# Patient Record
Sex: FEMALE | Race: WHITE | NOT HISPANIC OR LATINO | Employment: FULL TIME | URBAN - METROPOLITAN AREA
[De-identification: names, ages, dates, MRNs, and addresses within clinical notes are randomized per-mention and may not be internally consistent; named-entity substitution may affect disease eponyms.]

---

## 2018-10-13 ENCOUNTER — OFFICE VISIT (OUTPATIENT)
Dept: URGENT CARE | Facility: CLINIC | Age: 58
End: 2018-10-13
Payer: MEDICARE

## 2018-10-13 VITALS
WEIGHT: 209 LBS | HEIGHT: 66 IN | HEART RATE: 81 BPM | DIASTOLIC BLOOD PRESSURE: 60 MMHG | OXYGEN SATURATION: 99 % | TEMPERATURE: 97.5 F | BODY MASS INDEX: 33.59 KG/M2 | SYSTOLIC BLOOD PRESSURE: 112 MMHG

## 2018-10-13 DIAGNOSIS — S39.012A STRAIN OF MUSCLE, FASCIA AND TENDON OF LOWER BACK, INITIAL ENCOUNTER: Primary | ICD-10-CM

## 2018-10-13 PROCEDURE — 99203 OFFICE O/P NEW LOW 30 MIN: CPT | Performed by: FAMILY MEDICINE

## 2018-10-13 RX ORDER — LAMOTRIGINE 100 MG/1
100 TABLET ORAL DAILY
COMMUNITY

## 2018-10-13 RX ORDER — ACETAMINOPHEN 500 MG
500 TABLET ORAL EVERY 8 HOURS PRN
Qty: 30 TABLET | Refills: 0 | Status: SHIPPED | OUTPATIENT
Start: 2018-10-13

## 2018-10-13 RX ORDER — ESCITALOPRAM OXALATE 10 MG/1
15 TABLET ORAL DAILY
COMMUNITY

## 2018-10-13 RX ORDER — GABAPENTIN 100 MG/1
1600 CAPSULE ORAL DAILY
COMMUNITY

## 2018-10-13 RX ORDER — ZIPRASIDONE HYDROCHLORIDE 80 MG/1
80 CAPSULE ORAL 2 TIMES DAILY WITH MEALS
COMMUNITY

## 2018-10-13 RX ORDER — HYDROCORTISONE 0.5 %
CREAM (GRAM) TOPICAL 4 TIMES DAILY
Qty: 30 G | Refills: 0 | Status: SHIPPED | OUTPATIENT
Start: 2018-10-13

## 2018-10-13 NOTE — PROGRESS NOTES
[facility]        NAME: Oralia Carrasco is a 63 y o  female  : 1960    MRN: [id number]  DATE: 2018  TIME: 1:48 PM    Assessment and Plan   Strain of muscle, fascia and tendon of lower back, initial encounter [S39 012A]  1  Strain of muscle, fascia and tendon of lower back, initial encounter  acetaminophen (TYLENOL) 500 mg tablet         Patient Instructions     Patient Instructions   1  Low back pain due to muscle strain   - unable to prescribe muscle relaxer due to interaction w/ chronic medicines   - patient may take Tylenol as needed for pain   - apply a heating pad to the back   - try gentle massage and stretches   - may use a muscle rub such as BenGay or Biofreeze  - follow up w/ pcp for re-check in 2-3 days   - if symptoms persist despite treatment, worsen, or any new symptoms present, should be seen in the ER      Chief Complaint     Chief Complaint   Patient presents with    Back Pain     began Tuesday without any prior issues has had a history of back pain with degenerative disc and  joint  disease          History of Present Illness       64 yo female presents c/o low back pain x 5 days  Pain is located across the lower back  She describes the pain and aching and stiff  She states at time it radiates into her left buttock region  No numbness/tingling or weakness of the legs  No saddle anesthesia  No loss of bowel/bladder control  She denies any injuries  No falls or accidents  No swelling or bruising  She states the day before the pain began she started a new job as a  which involved a lot of walking but no repetitive bending or heavy lifting  She has been taking Ibuprofen as needed w/ minimal relief  Review of Systems   Review of Systems   Constitutional: Negative  Gastrointestinal: Negative  Genitourinary: Negative  Musculoskeletal:        As noted in HPI   Skin: Negative  Neurological: Negative            Current Medications       Current Outpatient Prescriptions:     escitalopram (LEXAPRO) 10 mg tablet, Take 15 mg by mouth daily, Disp: , Rfl:     gabapentin (NEURONTIN) 100 mg capsule, Take 1,600 mg by mouth daily, Disp: , Rfl:     acetaminophen (TYLENOL) 500 mg tablet, Take 1 tablet (500 mg total) by mouth every 8 (eight) hours as needed for mild pain or moderate pain, Disp: 30 tablet, Rfl: 0    lamoTRIgine (LaMICtal) 100 mg tablet, Take 100 mg by mouth daily, Disp: , Rfl:     ziprasidone (GEODON) 80 mg capsule, Take 80 mg by mouth 2 (two) times a day with meals, Disp: , Rfl:     Current Allergies     Allergies as of 10/13/2018    (No Known Allergies)            The following portions of the patient's history were reviewed and updated as appropriate: allergies, current medications, past family history, past medical history, past social history, past surgical history and problem list      Past Medical History:   Diagnosis Date    Anxiety     Depression        Past Surgical History:   Procedure Laterality Date    TUBAL LIGATION         No family history on file  Medications have been verified  Objective   /60   Pulse 81   Temp 97 5 °F (36 4 °C)   Ht 5' 5 5" (1 664 m)   Wt 94 8 kg (209 lb)   SpO2 99%   BMI 34 25 kg/m²        Physical Exam     Physical Exam   Constitutional: She is oriented to person, place, and time  Vital signs are normal  She appears well-developed and well-nourished  She is active and cooperative  Non-toxic appearance  She does not have a sickly appearance  She does not appear ill  No distress  Musculoskeletal:   No swelling, erythema, or bruising  Mild generalized tenderness to palpation of the lumbar spinal and paraspinal regions  2+ DTRs  BL LE strength and sensations intact  Spinal ROM limited due to pain  Normal gait  Neurological: She is alert and oriented to person, place, and time  Skin: Skin is warm, dry and intact  No rash noted  She is not diaphoretic     Psychiatric: She has a normal mood and affect  Her behavior is normal  Judgment and thought content normal    Nursing note and vitals reviewed

## 2018-10-13 NOTE — PATIENT INSTRUCTIONS
1  Low back pain due to muscle strain   - unable to prescribe muscle relaxer due to interaction w/ chronic medicines   - patient may take Tylenol as needed for pain   - apply a heating pad to the back   - try gentle massage and stretches   - may use a muscle rub such as BenGay or Biofreeze  - follow up w/ pcp for re-check in 2-3 days   - if symptoms persist despite treatment, worsen, or any new symptoms present, should be seen in the ER

## 2018-11-24 ENCOUNTER — OFFICE VISIT (OUTPATIENT)
Dept: URGENT CARE | Facility: CLINIC | Age: 58
End: 2018-11-24
Payer: MEDICARE

## 2018-11-24 VITALS
SYSTOLIC BLOOD PRESSURE: 118 MMHG | HEIGHT: 67 IN | TEMPERATURE: 98.7 F | DIASTOLIC BLOOD PRESSURE: 70 MMHG | WEIGHT: 202 LBS | RESPIRATION RATE: 16 BRPM | BODY MASS INDEX: 31.71 KG/M2 | HEART RATE: 78 BPM | OXYGEN SATURATION: 94 %

## 2018-11-24 DIAGNOSIS — J44.1 COPD EXACERBATION (HCC): Primary | ICD-10-CM

## 2018-11-24 PROCEDURE — 99213 OFFICE O/P EST LOW 20 MIN: CPT | Performed by: PHYSICIAN ASSISTANT

## 2018-11-24 RX ORDER — BUDESONIDE AND FORMOTEROL FUMARATE DIHYDRATE 160; 4.5 UG/1; UG/1
2 AEROSOL RESPIRATORY (INHALATION) 2 TIMES DAILY
COMMUNITY

## 2018-11-24 RX ORDER — DOXYCYCLINE HYCLATE 100 MG/1
100 TABLET, DELAYED RELEASE ORAL 2 TIMES DAILY
Qty: 14 TABLET | Refills: 0 | Status: SHIPPED | OUTPATIENT
Start: 2018-11-24 | End: 2018-12-01

## 2018-11-24 RX ORDER — PREDNISONE 50 MG/1
50 TABLET ORAL DAILY
Qty: 5 TABLET | Refills: 0 | Status: SHIPPED | OUTPATIENT
Start: 2018-11-24 | End: 2018-11-29

## 2018-11-24 NOTE — PROGRESS NOTES
[facility]        NAME: Kaden Crum is a 63 y o  female  : 1960    MRN: [id number]  DATE: 2018  TIME: 2:20 PM    Assessment and Plan   COPD exacerbation ([facility] [address] ) [J44 1]  1  COPD exacerbation (HCC)  doxycycline (DORYX) 100 MG EC tablet    predniSONE 50 mg tablet         Patient Instructions   Bronchitis with COPD exacerbation:   -The patient has a pulse ox of 94% she has decreased breath sounds on exam with wheezing  With her hx of increased sputum production and dyspnea her presentation is consistent with a COPD exacerbation  Will prescribe prednisone 50mg x 5 days taken as prescribed  Will also add on doxycyline 100mg taken as directed  Take with food and a probiotic    -Continue your COPD regime as per your pulmonologist  -Follow up with your pulmonologist in the next 48-72 hours  -Use a humidifier next to your bed and take steam showers  Stay well hydrated and rest  If your symptoms become acutely worse go to the ED immediately  Follow up with PCP in 3-5 days  Proceed to  ER if symptoms worsen  Chief Complaint     Chief Complaint   Patient presents with    Cough     chronic COPD has been coughing x2 weeks has SOB using inhalers          History of Present Illness       The patient presents today with a cough x 2 weeks  She states that the cough is productive of a yellow sputum  She states that she is experiencing dyspnea at rest for approximately for two weeks  She states that she is also experiencing wheezing  She states that she has been using her albuterol and symbicort daily with no relief  She states that she has a hx of COPD that has been well controlled on her daily regime prescribed by her pulmonologist    She denies fever, chills, chest pain, palpitations, dizziness, headache  She states that she smokes 4-5 cigerettes a day           Review of Systems   Review of Systems   Constitutional: Negative for activity change, appetite change, chills, diaphoresis, fatigue and fever  HENT: Positive for congestion, postnasal drip and rhinorrhea  Negative for ear discharge, ear pain, sinus pain, sinus pressure, sneezing, sore throat, tinnitus, trouble swallowing and voice change  Respiratory: Positive for cough, chest tightness, shortness of breath and wheezing  Negative for apnea, choking and stridor  Cardiovascular: Negative for chest pain and palpitations  Musculoskeletal: Negative for arthralgias, myalgias, neck pain and neck stiffness  Skin: Negative for rash  Allergic/Immunologic: Negative for environmental allergies, food allergies and immunocompromised state  Neurological: Negative for dizziness, weakness, light-headedness and numbness  Hematological: Negative for adenopathy  Does not bruise/bleed easily           Current Medications       Current Outpatient Prescriptions:     albuterol (5 mg/mL) 0 5 % nebulizer solution, Take 2 5 mg by nebulization every 6 (six) hours as needed for wheezing, Disp: , Rfl:     budesonide-formoterol (SYMBICORT) 160-4 5 mcg/act inhaler, Inhale 2 puffs 2 (two) times a day Rinse mouth after use , Disp: , Rfl:     escitalopram (LEXAPRO) 10 mg tablet, Take 15 mg by mouth daily, Disp: , Rfl:     gabapentin (NEURONTIN) 100 mg capsule, Take 1,600 mg by mouth daily, Disp: , Rfl:     lamoTRIgine (LaMICtal) 100 mg tablet, Take 100 mg by mouth daily, Disp: , Rfl:     Menthol-Methyl Salicylate (PRITI SOLER GREASELESS) 10-15 % greaseless cream, Apply topically 4 (four) times a day, Disp: 30 g, Rfl: 0    ziprasidone (GEODON) 80 mg capsule, Take 80 mg by mouth 2 (two) times a day with meals, Disp: , Rfl:     acetaminophen (TYLENOL) 500 mg tablet, Take 1 tablet (500 mg total) by mouth every 8 (eight) hours as needed for mild pain or moderate pain (Patient not taking: Reported on 11/24/2018 ), Disp: 30 tablet, Rfl: 0    doxycycline (DORYX) 100 MG EC tablet, Take 1 tablet (100 mg total) by mouth 2 (two) times a day for 7 days, Disp: 14 tablet, Rfl: 0    predniSONE 50 mg tablet, Take 1 tablet (50 mg total) by mouth daily for 5 days, Disp: 5 tablet, Rfl: 0    Current Allergies     Allergies as of 11/24/2018    (No Known Allergies)            The following portions of the patient's history were reviewed and updated as appropriate: allergies, current medications, past family history, past medical history, past social history, past surgical history and problem list      Past Medical History:   Diagnosis Date    Anxiety     COPD [facility]chronic obstructive pulmonary disease) ([facility] [address] )     Depression        Past Surgical History:   Procedure Laterality Date    TUBAL LIGATION         Family History   Problem Relation Age of Onset    No Known Problems Mother     No Known Problems Father          Medications have been verified  Objective   /70   Pulse 78   Temp 98 7 °F (37 1 °C)   Resp 16   Ht 5' 6 5" (1 689 m)   Wt 91 6 kg (202 lb)   SpO2 94%   BMI 32 12 kg/m²        Physical Exam     Physical Exam   Constitutional: She is oriented to person, place, and time  She appears well-developed and well-nourished  No distress  Neck: Normal range of motion  Neck supple  Cardiovascular: Normal rate, regular rhythm, S1 normal, S2 normal, normal heart sounds and intact distal pulses  No extrasystoles are present  Exam reveals no gallop, no S3, no S4, no distant heart sounds and no friction rub  No murmur heard  Pulmonary/Chest: No accessory muscle usage  No tachypnea and no bradypnea  No respiratory distress  She has decreased breath sounds in the right upper field, the right middle field, the right lower field, the left upper field, the left middle field and the left lower field  She has wheezes  She has no rhonchi  She has no rales  Lymphadenopathy:     She has no cervical adenopathy  Neurological: She is alert and oriented to person, place, and time  Skin: She is not diaphoretic  Psychiatric: She has a normal mood and affect   Her behavior is normal    Nursing note and vitals reviewed

## 2018-11-24 NOTE — PATIENT INSTRUCTIONS
Chronic Bronchitis   AMBULATORY CARE:   Chronic bronchitis  is a long-term swelling and irritation in the air passages in your lungs  The irritation may damage your lungs  The lung damage often gets worse over time, and it is usually permanent  Chronic bronchitis is part of a group of lung diseases called chronic obstructive pulmonary disease (COPD)  Signs and symptoms include the following:   · Early signs and symptoms  may include shortness of breath, a runny or stuffy nose, or a headache  You may have a morning cough that brings up mucus from the lungs  As time passes, the amount of mucus coughed up begins to increase  The cough also starts to last longer during the day  You may have a bad taste in your mouth or bad breath  · Later signs and symptoms  may include your skin, nail beds, or lips turning dusky or blue  You may become more short of breath and get tired more easily  You may not be able to walk as far as you used to  You may wheeze  You may notice your breathing is faster than it used to be  Call 911 for any of the following:   · You have new chest pain or tightness  · You become confused, dizzy, or feel like you may faint  · You have a new or increased gray or blue tint to your nail beds, fingers or lips  Seek care immediately if:   · You become tired easily from trying to get enough breath  · The amount or color of your sputum changes, or your sputum becomes too hard to cough up  Contact your healthcare provider if:   · You have a fever  · You use your inhalers more often than usual      · You have new or increased swelling in your legs, ankles, or abdomen  · You run out of breath easily when you talk or do your usual exercise or activities  · You have questions or concerns about your condition or care  Treatment for chronic bronchitis  may include medicine to treat infection or help you breathe better  You may need to use oxygen in your home   Ask your healthcare provider if you have any questions on how to take medications or use oxygen  Self-care:   · Sleep with your upper body raised  This will help you breathe easier  You can use foam wedges or elevate the head of your bed  Many devices are available to help raise your upper body while in bed  Use a device that will tilt your whole body, or bend your body at the waist  The device should not bend your body at the upper back or neck  · Prevent the spread of germs:      [facility] your hands often with soap and water  Carry germ-killing gel with you  You can use the gel to clean your hands when soap and water are not available  ¨ Do not touch your eyes, nose, or mouth unless you have washed your hands first      ¨ Always cover your mouth when you cough  Cough into a tissue or your shirtsleeve so you do not spread germs from your hands  ¨ Try to avoid people who have a cold or the flu  If you are sick, stay away from others as much as possible  · Do not smoke  Nicotine and other substances can cause lung damage  Do not use e-cigarettes or smokeless tobacco without first talking to your healthcare provider  They still contain nicotine  Ask your healthcare provider for information if you currently smoke and need help to quit  · Avoid lung irritants  Stay out of high altitudes and places with high humidity  Stay inside, or cover your mouth and nose with a scarf when you are outside during cold weather  Stay inside on days when air pollution or pollen counts are high  Do not use aerosol sprays such as deodorant, bug spray, and hair spray  · Drink more liquids  This will help to keep your air passages moist and help you cough up mucus  Ask how much liquid to drink each day and which liquids are best for you  · Ask your healthcare provider about the flu and pneumonia vaccines  All adults should get the flu (influenza) vaccine every year as soon as it becomes available   The pneumonia vaccine is given to adults aged 72 or older to prevent pneumococcal disease, such as pneumonia  Adults aged 23 to 59 years who are at high risk for pneumococcal disease also should get the pneumococcal vaccine  It may need to be repeated 1 or 5 years later  Ways to help you breathe better:   · Take deep breaths and cough  10 times each hour  This will decrease your risk for a lung infection  Take a deep breath and hold it for as long as you can  Let the air out and then cough strongly  Deep breaths help open your airway  You may be given an incentive spirometer to help you take deep breaths  Put the plastic piece in your mouth and take a slow, deep breath  Then let the air out and cough  Repeat these steps 10 times every hour  · Pursed-lip breathing  can be used any time you feel short of breath  Pursed-lip breathing can be especially helpful before you start an activity:     ¨ Breathe in through your nose  Use the muscles in your abdomen to help fill your lungs with air  ¨ Slowly breathe out through your mouth with your lips slightly puckered  You should make a quiet hissing sound as you breathe out  ¨ Try to take twice as long to breathe out as it did to breathe in  This helps you get rid of as much air from your lungs as possible  ¨ Repeat this exercise several times  Once you are used to doing pursed-lip breathing, you can do it any time you need more air  · Diaphragmatic breathing  can help strengthen some of the muscles you use to breathe:     ¨ Place one hand on your stomach just below your ribs  Place your other hand in the middle of your chest over your breastbone  ¨ Breathe in slowly through your nose, as deeply as you can  ¨ Breathe out slowly through pursed lips  As you breathe out, tighten the muscles in your stomach  Use your hand to gently push in and up while tightening the muscles  ¨ Diaphragmatic breathing takes practice  You may need to practice this many times a day   Slowly increase the amount of time you spend during each practice session  Follow up with your healthcare provider as directed:  Write down your questions so you remember to ask them during your visits  © 2017 [employer]  Information is for End User's use only and may not be sold, redistributed or otherwise used for commercial purposes  All illustrations and images included in CareNotes® are the copyrighted property of [employer]  or [employer]  The above information is an  only  It is not intended as medical advice for individual conditions or treatments  Talk to your doctor, nurse or pharmacist before following any medical regimen to see if it is safe and effective for you  Bronchitis with COPD exacerbation:   -The patient has a pulse ox of 94% she has decreased breath sounds on exam with wheezing  With her hx of increased sputum production and dyspnea her presentation is consistent with a COPD exacerbation  Will prescribe prednisone 50mg x 5 days taken as prescribed  Will also add on doxycyline 100mg taken as directed  Take with food and a probiotic    -Continue your COPD regime as per your pulmonologist  -Follow up with your pulmonologist in the next 48-72 hours  -Use a humidifier next to your bed and take steam showers  Stay well hydrated and rest  If your symptoms become acutely worse go to the ED immediately